# Patient Record
Sex: FEMALE | Race: WHITE | NOT HISPANIC OR LATINO
[De-identification: names, ages, dates, MRNs, and addresses within clinical notes are randomized per-mention and may not be internally consistent; named-entity substitution may affect disease eponyms.]

---

## 2022-09-02 ENCOUNTER — NON-APPOINTMENT (OUTPATIENT)
Age: 67
End: 2022-09-02

## 2023-10-24 PROBLEM — Z00.00 ENCOUNTER FOR PREVENTIVE HEALTH EXAMINATION: Status: ACTIVE | Noted: 2023-10-24

## 2023-10-25 ENCOUNTER — APPOINTMENT (OUTPATIENT)
Dept: ORTHOPEDIC SURGERY | Facility: CLINIC | Age: 68
End: 2023-10-25
Payer: MEDICARE

## 2023-10-25 DIAGNOSIS — M76.892 OTHER SPECIFIED ENTHESOPATHIES OF LEFT LOWER LIMB, EXCLUDING FOOT: ICD-10-CM

## 2023-10-25 DIAGNOSIS — M76.891 OTHER SPECIFIED ENTHESOPATHIES OF RIGHT LOWER LIMB, EXCLUDING FOOT: ICD-10-CM

## 2023-10-25 PROCEDURE — 73502 X-RAY EXAM HIP UNI 2-3 VIEWS: CPT

## 2023-10-25 PROCEDURE — 99203 OFFICE O/P NEW LOW 30 MIN: CPT

## 2024-03-18 ENCOUNTER — APPOINTMENT (OUTPATIENT)
Dept: ORTHOPEDIC SURGERY | Facility: CLINIC | Age: 69
End: 2024-03-18
Payer: MEDICARE

## 2024-03-18 PROCEDURE — 99214 OFFICE O/P EST MOD 30 MIN: CPT

## 2024-03-18 NOTE — ASSESSMENT
[FreeTextEntry1] : 68 yo female presents today for eval of her neck pain. X-ray shows severe DDD C4-7. Examination suggestive of myelopathy. Patient had had MIHIR x1 with Dr. Casas with only short-term relief in symptoms. I recommend proceeding with MRI at this time for further evaluation of nerve compression.   - Patient given prescription for MRI, follow up after study is completed to discuss results.   - Recommend physical therapy to regain range of motion, strengthening and symptomatic improvement. Prescription given in office today.   - Recommend NSAIDs PRN - Recommend heating pad use to decrease muscle spasm - Discussed the importance of home exercises, including but not limited to neck stretching and cervical traction   Patient was educated on their diagnosis today. All questions answered and patient expressed understanding.  I had a long discussion with the patient about diagnosis, natural history of disease, treatment options and prognosis. Patient understands that the symptoms of gait instability and difficulty with fine motor manipulation is associated with cervical myelopathy. Patient is thoroughly counseled about stepwise progression of the disease and possible functional decline. The rational for surgery on cervical myelopathy is to halt further progression of the disease and resulting functional decline.   F/U after MRI

## 2024-03-18 NOTE — IMAGING
[Outside films reviewed] : Outside films reviewed [Straightening consistent with spasm] : Straightening consistent with spasm [Disc space narrowing] : Disc space narrowing [Facet arthropathy] : Facet arthropathy

## 2024-03-18 NOTE — HISTORY OF PRESENT ILLNESS
[Neck] : neck [Gradual] : gradual [7] : 7 [Shooting] : shooting [Tightness] : tightness [Constant] : constant [Walking] : walking [de-identified] : Patient presents today with neck pain x~4 months with NKI Pt reports constant pain in back and right side of neck radiating up into head and into ear and jaw  Pt reports "worsening eyesight" and "drooping eyelid" in right eye.  Reports intermittent headaches Reports pain radiating into right shoulder and down right arm Reports "tingling" in right arm and hand/ digits as well as in bilat legs.  Pt states she is "unstable on her feet" and is "frequently dropping items"  Pt started Gabapentin 300mg hs on 3/15/24 per Dr Casas with little improvement.  Pt had injections with Dr Casas on 3/15 (pt is unsure what kind of injections) with ~45% relief in pain.  Pt reports finishing 2 8 weeks sessions of PT with no relief.  [facility]- denies MRI of C spine.  [] : no [FreeTextEntry6] : tingling  [FreeTextEntry7] : head and right arm  [de-identified] : sleeping  [de-identified] : Pt not working.  [de-identified] : Xrays at [facility] per Dr Casas

## 2024-03-18 NOTE — PHYSICAL EXAM
[de-identified] : Constitutional: Well groomed and developed.  Respiratory: Normal, unlabored breathing. No use of accessory muscles.  Skin: No rashes or ulcers. Skin warm and dry.  Psychiatric: Oriented to time, place, person and event. No acute distress.   Neck:    Posture: normal   AROM:  Flexion- chin to chest  Extension- 0 R rotation- 20 L rotation- 20 Moderate pain with neck ROM.  Gait: Unable to perform tandem gait  Tenderness:  Cervical: Moderate tenderness on palpation    Motor:                        R               L              UE:                   Deltoid            5                5 WE                 5                5 Triceps          5                5                5                5 Intrinsics       5                5 Biceps          5                5                                  R         L DTR:  Biceps:                     2+        2+ Brachioradialis:        2+        2+ Triceps:                   2+         2+   Sensory: Light touch sensation intact on C5-T1  Spurling sign: Positive bilaterally  Babinski's sign: Negative Bilaterally Cormier's sign: Positive bilaterally  Abduction sign: Negative Bilaterally

## 2024-03-25 ENCOUNTER — APPOINTMENT (OUTPATIENT)
Dept: ORTHOPEDIC SURGERY | Facility: CLINIC | Age: 69
End: 2024-03-25
Payer: MEDICARE

## 2024-03-25 DIAGNOSIS — G95.9 DISEASE OF SPINAL CORD, UNSPECIFIED: ICD-10-CM

## 2024-03-25 DIAGNOSIS — M48.02 SPINAL STENOSIS, CERVICAL REGION: ICD-10-CM

## 2024-03-25 DIAGNOSIS — M47.812 SPONDYLOSIS W/OUT MYELOPATHY OR RADICULOPATHY, CERVICAL REGION: ICD-10-CM

## 2024-03-25 DIAGNOSIS — M48.062 SPINAL STENOSIS, LUMBAR REGION WITH NEUROGENIC CLAUDICATION: ICD-10-CM

## 2024-03-25 DIAGNOSIS — M62.838 OTHER MUSCLE SPASM: ICD-10-CM

## 2024-03-25 PROCEDURE — 99214 OFFICE O/P EST MOD 30 MIN: CPT

## 2024-03-25 NOTE — ASSESSMENT
[FreeTextEntry1] : MRI from 3/22/2024 shows HNP and mild stenosis C4-5, moderate stenosis C5-6, 6-7.  Brain arachnoid cyst seen on MRI- patient given referral for Neurosurgeon Dr Vail for evaluation and treatment.   Patient is being referred to pain management for Epidural Steroid injection.   - Patient given prescription for EMG/NCS, follow up after study is completed to discuss results.   - Recommend physical therapy to regain range of motion, strengthening and symptomatic improvement. Prescription given in office today.   - Recommend NSAIDs PRN - Recommend heating pad use to decrease muscle spasm - Discussed the importance of home exercises, including but not limited to neck stretching and cervical traction   Patient was educated on their diagnosis today. All questions answered and patient expressed understanding.  I had a long discussion with the patient about diagnosis, natural history of disease, treatment options and prognosis. Patient understands that the symptoms of gait instability and difficulty with fine motor manipulation is associated with cervical myelopathy. Patient is thoroughly counseled about stepwise progression of the disease and possible functional decline. The rational for surgery on cervical myelopathy is to halt further progression of the disease and resulting functional decline.   F/U after EMG/ injections

## 2024-03-25 NOTE — HISTORY OF PRESENT ILLNESS
[de-identified] : Follow up cervical spine MRI Results at [facility]. Patient states she has constant pain. Patient denies starting PT. Patient D/C Gabapentin. Patient admits to taking Advil for pain with some relief.  Today's Pain 7/10

## 2024-03-25 NOTE — DATA REVIEWED
[MRI] : MRI [Cervical Spine] : cervical spine [Report was reviewed and noted in the chart] : The report was reviewed and noted in the chart [I independently reviewed and interpreted images and report] : I independently reviewed and interpreted images and report [FreeTextEntry1] : HNP with moderate stenosis C4-5, moderate stenosis C5-6, 6-7

## 2024-03-25 NOTE — PHYSICAL EXAM
[de-identified] : Constitutional: Well groomed and developed.  Respiratory: Normal, unlabored breathing. No use of accessory muscles.  Skin: No rashes or ulcers. Skin warm and dry.  Psychiatric: Oriented to time, place, person and event. No acute distress.   Neck:    Posture: normal   AROM:  Flexion- chin to chest  Extension- 0 R rotation- 20 L rotation- 20 Moderate pain with neck ROM.  Gait: Unable to perform tandem gait  Tenderness:  Cervical: Moderate tenderness on palpation    Motor:                        R               L              UE:                   Deltoid            5                5 WE                 5                5 Triceps          5                5                5                5 Intrinsics       5                5 Biceps          5                5                                  R         L DTR:  Biceps:                     2+        2+ Brachioradialis:        2+        2+ Triceps:                   2+         2+   Sensory: Light touch sensation intact on C5-T1  Spurling sign: Positive bilaterally  Babinski's sign: Negative Bilaterally Cormier's sign: Positive bilaterally  Abduction sign: Negative Bilaterally

## 2024-03-28 ENCOUNTER — APPOINTMENT (OUTPATIENT)
Dept: NEUROSURGERY | Facility: CLINIC | Age: 69
End: 2024-03-28
Payer: MEDICARE

## 2024-03-28 VITALS
HEART RATE: 81 BPM | HEIGHT: 64 IN | WEIGHT: 293 LBS | DIASTOLIC BLOOD PRESSURE: 83 MMHG | BODY MASS INDEX: 50.02 KG/M2 | SYSTOLIC BLOOD PRESSURE: 162 MMHG

## 2024-03-28 DIAGNOSIS — M54.12 RADICULOPATHY, CERVICAL REGION: ICD-10-CM

## 2024-03-28 DIAGNOSIS — M47.22 OTHER SPONDYLOSIS WITH RADICULOPATHY, CERVICAL REGION: ICD-10-CM

## 2024-03-28 PROCEDURE — 99203 OFFICE O/P NEW LOW 30 MIN: CPT

## 2024-03-28 NOTE — HISTORY OF PRESENT ILLNESS
[de-identified] :  Is a pleasant 69-year-old accompanied by her  for evaluation primarily of her cervical spine and also for what appears to be an incidental finding of a retrocerebellar arachnoid cyst on MRI.  Her chief complaint is severe neck and arm pain on the right.  She states she woke with severe pain in October and thought she was maybe even having a stroke the symptoms were so severe.  She states some sensory difficulties on the right side of her face.  And vision issues with the right eye.  She had seen an ENT doctor for imbalance and was told that there were no active ear issues.  She is seeing a neurologist and orthopedic doctors along the way as well.  Seeing the orthopedic surgeon MRIs of the cervical spine were performed showing presence of a retrocerebellar arachnoid cyst this was followed up with a brain MRI and she is here for my evaluation based on that.  She has no overt symptoms of cerebellar dysfunction although she cannot heel-to-toe walk very well.  In the office she has no complaint suggestive of overt myelopathy on the right side radicular complaints.  Oct 2023  pain  fell of horse 2022

## 2024-03-28 NOTE — PHYSICAL EXAM
[Motor Tone] : muscle tone was normal in all four extremities [Motor Strength] : muscle strength was normal in all four extremities [Involuntary Movements] : no involuntary movements were seen [No Muscle Atrophy] : normal bulk in all four extremities [Motor Handedness Right-Handed] : the patient is right hand dominant [Sensation Tactile Decrease] : light touch was intact [Abnormal Walk] : normal gait [Romberg's Sign] : Romberg's sign was negtive [Allodynia] : no ~T allodynia present [FreeTextEntry5] : Cranial nerves are grossly intact however she has slight asymmetry of the peripheral anterior fold.  Hearing and vision otherwise are intact [FreeTextEntry8] : She has difficulty with heel toe walking but otherwise has normal gait

## 2024-03-28 NOTE — RESULTS/DATA
[FreeTextEntry1] : Open MRI of the cervical spine and brain were reviewed showing cervical spondylosis from C4-7 without spinal cord compression.  There is no evidence spinal cord signal change.  The brain MRI and cervical sagittal image shows a retrocerebellar arachnoid cyst that is really noncompressive.  There is no evidence of hydrocephalus.  The rest of the brain MRI shows some microvascular changes with a small area of signal focus in the right corona radiata as well as a periventricular area.  There are no vascular studies performed and this is not an MRI with contrast.  X-rays performed in the office were also reviewed AP lateral showing no evidence of overt cervical instability.

## 2024-03-28 NOTE — CONSULT LETTER
[Dear  ___] : Dear  [unfilled], [Courtesy Letter:] : I had the pleasure of seeing your patient, [unfilled], in my office today. [Please see my note below.] : Please see my note below. [Referral Closing:] : Thank you very much for seeing this patient.  If you have any questions, please do not hesitate to contact me. [Sincerely,] : Sincerely, [FreeTextEntry3] : Joni Vail DO, MPH, FACS Director, Neurosurgery and Spine  [facility]   , Neurological Surgery [place] [facility]  [DrRyan  ___] : Dr. EPPS [DrRyan ___] : Dr. EPPS

## 2024-03-28 NOTE — PLAN
[FreeTextEntry1] : DIAGNOSIS:  CERVICAL SPONDYLOSIS     INCIDENTAL RETRO CEREBELLAR ARACHNOID CYST    TREATMENT PLAN:  NON-SURGICAL   VS.   ACDF  C4-7  This is a patient with cervical spondylosis and stenosis.  I have recommended nonsurgical management at this time.  This consists of physical therapy and/or manual medicine in conjunction to medical therapy and other conservative methods.  These include the consideration of trigger point injections and or the utilization of modalities such as TENS where applicable.  The next tier would be the referral to a pain management specialist (anesthesia or physiatry) for the consideration of spinal injections.  This includes the options of epidural steroids, facet injections as well as other novel techniques that may provide pain relief.  Although there is indeed evidence of disk degeneration on imaging, discectomy or spinal fusion for the sole purposes of treating neck pain in the absence of a compressive lesion or neurological issue is associated with poor outcomes.   If all  nonsurgical methods fail, and/or the patient develops neurologic issues then, I have recommended cervical decompression and fusion as a treatment option.  This entails removing the disk, osteophytes and the ventral uncovertebral joints along with the underlying hypertrophied/ossified posterior longitudinal ligamentum.  This will allow for a widening of the spinal canal and the neuroforamen at the effected levels.  In doing so this will likely result in added instability to the spine at this level requiring the need for instrumented stabilization and fusion.  This implies the use of anterior plate fixation and interbody prosthetics to provide strength and anatomical alignment to the operated segments.   In reference to the arachnoid cyst I think that it is incidental and not causing any of her symptoms.  In reference to some of her symptoms that are difficult to explain such as the facial numbness and the visual difficulties and the difficulty with gait I think a more complete neurologic evaluation is appropriate including an MRI with and without contrast and potentially an MRA.  Full evaluation is reasonable to assure that there is no evidence of inflammatory demyelinating issues or otherwise.  She is getting electrodiagnostic studies to rule out primary neuropathy or neurologic causes other than cervical radiculopathy.  Risks and benefits of surgery were described to the patient in detail which include but not excluding those otherwise not mentioned:  coma paralysis, death, stroke, spinal fluid leak, nerve injury, weakness, infection, hardware malfunction/failure/mal-positioning requiring re-operation, vascular injury, nonunion/pseudoarthrosis, adjacent segment degeneration, dysphonia/dysphagia and persistent pain.  I referred her back to her orthopedic team for surgical treatment if necessary and referred her back to her neurologist for a complete workup and suggested.  I remain available in consultative availability.  I have reviewed the images in detail with the patient today in my office and have answered all questions regarding this condition to the best of my ability to the patients satisfaction.

## 2024-04-16 ENCOUNTER — APPOINTMENT (OUTPATIENT)
Dept: NEUROLOGY | Facility: CLINIC | Age: 69
End: 2024-04-16
Payer: MEDICARE

## 2024-04-16 PROCEDURE — 95886 MUSC TEST DONE W/N TEST COMP: CPT

## 2024-04-16 PROCEDURE — 95912 NRV CNDJ TEST 11-12 STUDIES: CPT

## 2024-05-09 ENCOUNTER — APPOINTMENT (OUTPATIENT)
Dept: ORTHOPEDIC SURGERY | Facility: CLINIC | Age: 69
End: 2024-05-09

## 2025-07-16 ENCOUNTER — APPOINTMENT (OUTPATIENT)
Dept: ORTHOPEDIC SURGERY | Facility: CLINIC | Age: 70
End: 2025-07-16
Payer: MEDICARE

## 2025-07-16 PROCEDURE — 99213 OFFICE O/P EST LOW 20 MIN: CPT

## 2025-07-16 PROCEDURE — 73502 X-RAY EXAM HIP UNI 2-3 VIEWS: CPT
